# Patient Record
Sex: MALE | Race: WHITE | URBAN - METROPOLITAN AREA
[De-identification: names, ages, dates, MRNs, and addresses within clinical notes are randomized per-mention and may not be internally consistent; named-entity substitution may affect disease eponyms.]

---

## 2017-04-04 ENCOUNTER — OFFICE VISIT (OUTPATIENT)
Dept: FAMILY MEDICINE | Facility: CLINIC | Age: 52
End: 2017-04-04

## 2017-04-04 VITALS
OXYGEN SATURATION: 96 % | WEIGHT: 192 LBS | HEART RATE: 69 BPM | DIASTOLIC BLOOD PRESSURE: 83 MMHG | TEMPERATURE: 98.4 F | SYSTOLIC BLOOD PRESSURE: 123 MMHG | BODY MASS INDEX: 26.01 KG/M2 | HEIGHT: 72 IN

## 2017-04-04 DIAGNOSIS — J30.1 SEASONAL ALLERGIC RHINITIS DUE TO POLLEN: ICD-10-CM

## 2017-04-04 DIAGNOSIS — I25.10 CORONARY ARTERY DISEASE INVOLVING NATIVE HEART WITHOUT ANGINA PECTORIS, UNSPECIFIED VESSEL OR LESION TYPE: ICD-10-CM

## 2017-04-04 DIAGNOSIS — R53.83 FATIGUE, UNSPECIFIED TYPE: Primary | ICD-10-CM

## 2017-04-04 DIAGNOSIS — E78.5 HYPERLIPIDEMIA LDL GOAL <100: ICD-10-CM

## 2017-04-04 DIAGNOSIS — E55.9 VITAMIN D DEFICIENCY: ICD-10-CM

## 2017-04-04 DIAGNOSIS — K20.0 EOSINOPHILIC ESOPHAGITIS: ICD-10-CM

## 2017-04-04 DIAGNOSIS — Z91.018 ALLERGY TO FOOD: ICD-10-CM

## 2017-04-04 LAB
BASOPHILS # BLD AUTO: 0 10E9/L (ref 0–0.2)
BASOPHILS NFR BLD AUTO: 0.3 %
DIFFERENTIAL METHOD BLD: NORMAL
EOSINOPHIL # BLD AUTO: 0.2 10E9/L (ref 0–0.7)
EOSINOPHIL NFR BLD AUTO: 3.1 %
ERYTHROCYTE [DISTWIDTH] IN BLOOD BY AUTOMATED COUNT: 14.4 % (ref 10–15)
HCT VFR BLD AUTO: 46.6 % (ref 40–53)
HGB BLD-MCNC: 15 G/DL (ref 13.3–17.7)
IMM GRANULOCYTES # BLD: 0 10E9/L (ref 0–0.4)
IMM GRANULOCYTES NFR BLD: 0.3 %
LYMPHOCYTES # BLD AUTO: 1.8 10E9/L (ref 0.8–5.3)
LYMPHOCYTES NFR BLD AUTO: 25.9 %
MCH RBC QN AUTO: 27.7 PG (ref 26.5–33)
MCHC RBC AUTO-ENTMCNC: 32.2 G/DL (ref 31.5–36.5)
MCV RBC AUTO: 86 FL (ref 78–100)
MONOCYTES # BLD AUTO: 0.6 10E9/L (ref 0–1.3)
MONOCYTES NFR BLD AUTO: 8.7 %
NEUTROPHILS # BLD AUTO: 4.4 10E9/L (ref 1.6–8.3)
NEUTROPHILS NFR BLD AUTO: 61.7 %
NRBC # BLD AUTO: 0 10*3/UL
NRBC BLD AUTO-RTO: 0 /100
PLATELET # BLD AUTO: 162 10E9/L (ref 150–450)
RBC # BLD AUTO: 5.41 10E12/L (ref 4.4–5.9)
TSH SERPL DL<=0.005 MIU/L-ACNC: 1.49 MU/L (ref 0.4–4)
WBC # BLD AUTO: 7 10E9/L (ref 4–11)

## 2017-04-04 RX ORDER — LOVASTATIN 40 MG
40 TABLET ORAL AT BEDTIME
COMMUNITY
End: 2017-11-21

## 2017-04-04 RX ORDER — AMLODIPINE BESYLATE 10 MG/1
10 TABLET ORAL DAILY
COMMUNITY
End: 2017-11-21

## 2017-04-04 RX ORDER — OMEPRAZOLE 40 MG/1
CAPSULE, DELAYED RELEASE ORAL DAILY
COMMUNITY
End: 2017-11-21

## 2017-04-04 RX ORDER — LORATADINE 10 MG/1
10 TABLET, ORALLY DISINTEGRATING ORAL DAILY
COMMUNITY

## 2017-04-04 ASSESSMENT — PAIN SCALES - GENERAL: PAINLEVEL: NO PAIN (0)

## 2017-04-04 NOTE — PROGRESS NOTES
SUBJECTIVE:                                                    Jesus Bennett is a 52 year old male who presents to clinic today for the following health issues:  He is a new patient.  Not sure he wants to establish care.  Recently moved from [facility].  Sees cardiologist from [place]. CAD/HTN stable.  No recent stress test     CHIEF COMPLAINT: Fatigue that has been going on for the past 5-6 weeks following an acute URI.  Has been seen through urgent care and was treated with azithro for ear infection 3 weeks ago and again treated with azithro 3 days ago just because. Had negative chest xrays 3/31/2017. Care Everywhere reviewed. Denies cough.  Has had ongoing PND without facial pain pressure and purulent nasal discharge.     Acute Illness and persitent fatigue.     Onset: 5-6 weeks ago with most symptoms resolved is feeling some persistent fatigue. Generally feeling tired.  Sleeping OK. From a social perpective he is currently unemployed, seeking employment and struggling with this.    Fever: no    Chills/Sweats: no    Headache (location?): no    Sinus Pressure:no    Conjunctivitis:  no    Ear Pain: no    Rhinorrhea: no    Congestion: YES- mild and improving    Sore Throat: no     Cough: no    Wheeze: no    Decreased Appetite: no    Nausea: no    Vomiting: no    Diarrhea:  no    Dysuria/Freq.: no    Fatigue/Achiness: YES    Sick/Strep Exposure: no     Therapies Tried and outcome: Urgent care visits. Two courses of antibiotic.    Considers himself to be generally healthy. Has had a physical in the past year.   Hyperlipidemia Follow-Up      Rate your low fat/cholesterol diet?: good    Taking statin?  Yes, no muscle aches from statin    Other lipid medications/supplements?:  none     Hypertension Follow-up      Outpatient blood pressures are being checked at home.  Results are normal.  HTN under control.    Low Salt Diet: low salt     Vascular Disease Follow-up:  Coronary Artery Disease (CAD)  Cardiac event 20 years  ago. Followed at [facility].  Has had atleast 2 stress tests since event both normal per patient history.      Chest pain or pressure, left side neck or arm pain: No    Shortness of breath/increased sweats/nausea with exertion: No    Pain in calves walking 1-2 blocks: No    Worsened or new symptoms since last visit: No    Nitroglycerin use: no    Daily aspirin use: Yes     GERD/Eosinophilic esophagitis with recent endoscopy.  Takes 40mg omeprazole daily.  Allergy testing has been recommended but he has not had this done. Known history of food allergies with remote testing      Problem list and histories reviewed & adjusted, as indicated.  Additional history: as documented    Patient Active Problem List    Diagnosis Date Noted     Coronary artery disease involving native heart without angina pectoris, unspecified vessel or lesion type 04/06/2017     Priority: Medium     Seasonal allergic rhinitis due to pollen 04/06/2017     Priority: Medium     Allergy to food 04/06/2017     Priority: Medium     Eosinophilic esophagitis 04/06/2017     Priority: Medium     Fatigue, unspecified type 04/06/2017     Priority: Medium     Benign essential hypertension 04/06/2017     Priority: Medium     Hyperlipidemia LDL goal <100 04/06/2017     Priority: Medium     On a statin, mevacor.  Has had myalgia with other statins.       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 No pertinent surgical history.    Family History   Problem Relation Age of Onset     Hypertension Father      Prostate Cancer Father        Social History   Substance Use Topics     Smoking status: Never Smoker     Smokeless tobacco:  Not on file     Alcohol use Yes       Social History     Social History Narrative       Current Outpatient Prescriptions   Medication Sig Dispense Refill     Albuterol Sulfate 108 (90 BASE) MCG/ACT AEPB Inhale 2 puffs into the lungs every 4 hours       amLODIPine (NORVASC) 10 MG tablet Take 10 mg by mouth daily       aspirin 81 MG tablet Take 81 mg by mouth daily       loratadine (CLARITIN REDITABS) 10 MG ODT tab Take 10 mg by mouth daily       lovastatin (MEVACOR) 40 MG tablet Take 40 mg by mouth At Bedtime       omeprazole (PRILOSEC) 40 MG capsule Take by mouth daily             Reviewed and updated as needed this visit by clinical staff  Tobacco  Allergies  Meds  Problems  Med Hx  Surg Hx  Fam Hx  Soc Hx        Reviewed and updated as needed this visit by Provider  Tobacco  Allergies  Meds  Problems  Med Hx  Surg Hx  Fam Hx  Soc Hx        ROS:  Constitutional, HEENT, cardiovascular, pulmonary, GI, , musculoskeletal, neuro, skin, endocrine and psych systems are negative, except as otherwise noted.    OBJECTIVE:                                                    /83  Pulse 69  Temp 98.4  F (36.9  C) (Axillary)  Ht 6' (182.9 cm)  Wt 192 lb (87.1 kg)  SpO2 96%  BMI 26.04 kg/m2  Body mass index is 26.04 kg/(m^2).  GENERAL: healthy, alert and no distress  HENT: ear canals and TM's normal, nose and mouth without ulcers or lesions  NECK: no adenopathy, no asymmetry, masses, or scars and thyroid normal to palpation  RESP: lungs clear to auscultation - no rales, rhonchi or wheezes  CV: regular rate and rhythm, normal S1 S2, no S3 or S4, no murmur, click or rub, no peripheral edema and peripheral pulses strong  ABDOMEN: soft, nontender, no hepatosplenomegaly, no masses and bowel sounds normal  SKIN: no suspicious lesions or rashes  PSYCH: well dressed and groomed.  Good eye contact and is cooperative. Thoughts linear.  No delusions, compulsions or paranoia.  Affect flat.  Patient denies homicidal  and suicidal ideation as well as no thoughts or actions of self-harm.      Diagnostic Test Results:  Results for orders placed or performed in visit on 04/04/17   Vitamin D Deficiency   Result Value Ref Range    Vitamin D Deficiency screening 16 (L) 20 - 75 ug/L   TSH with free T4 reflex   Result Value Ref Range    TSH 1.49 0.40 - 4.00 mU/L   CBC with platelets differential   Result Value Ref Range    WBC 7.0 4.0 - 11.0 10e9/L    RBC Count 5.41 4.4 - 5.9 10e12/L    Hemoglobin 15.0 13.3 - 17.7 g/dL    Hematocrit 46.6 40.0 - 53.0 %    MCV 86 78 - 100 fl    MCH 27.7 26.5 - 33.0 pg    MCHC 32.2 31.5 - 36.5 g/dL    RDW 14.4 10.0 - 15.0 %    Platelet Count 162 150 - 450 10e9/L    Diff Method Automated Method     % Neutrophils 61.7 %    % Lymphocytes 25.9 %    % Monocytes 8.7 %    % Eosinophils 3.1 %    % Basophils 0.3 %    % Immature Granulocytes 0.3 %    Nucleated RBCs 0 0 /100    Absolute Neutrophil 4.4 1.6 - 8.3 10e9/L    Absolute Lymphocytes 1.8 0.8 - 5.3 10e9/L    Absolute Monocytes 0.6 0.0 - 1.3 10e9/L    Absolute Eosinophils 0.2 0.0 - 0.7 10e9/L    Absolute Basophils 0.0 0.0 - 0.2 10e9/L    Abs Immature Granulocytes 0.0 0 - 0.4 10e9/L    Absolute Nucleated RBC 0.0         ASSESSMENT/PLAN:                                                          ICD-10-CM    1. Fatigue, unspecified type R53.83 Vitamin D Deficiency     TSH with free T4 reflex     CBC with platelets differential   2. Vitamin D deficiency E55.9 Vitamin D Deficiency   3. Eosinophilic esophagitis K20.0 ALLERGY/ASTHMA ADULT REFERRAL   4. Allergy to food Z91.018 ALLERGY/ASTHMA ADULT REFERRAL   5. Seasonal allergic rhinitis due to pollen J30.1 ALLERGY/ASTHMA ADULT REFERRAL   6. Coronary artery disease involving native heart without angina pectoris, unspecified vessel or lesion type I25.10    7. Hyperlipidemia LDL goal <100 E78.5        Labs as above.  I do not recommend additional antibiotic at this point-no sign of bacterial infection.  Fatigue  differential is long and likely multi-factorial: Post-infectious, psychogenic, allergy related.  Encouraged allergy testing for eosinophilic esophagitis.  Discussed long term issues with taking PPI.  If needed it is appropriate but if specific food allergy is found it is possible PPI treatment can be reduced or stopped all together. Allergies could be contributing to fatigue. Consider vitamin B supplement.  Consider establishing care and come in for physical.  HTN/ hyperlipidemia and CAD appear to be well controlled.  Vitamin D supplement recommended see result note to patient.  Follow up if you have any worsening or persistent problems or concerns.        Elizabeth Peña PA-C  [facility]

## 2017-04-04 NOTE — NURSING NOTE
Chief Complaint   Patient presents with     Fatigue     He is not sure he wants to establish care yet.     52 year old      Blood pressure 123/83, pulse 69, temperature 98.4  F (36.9  C), temperature source Axillary, height 6' (182.9 cm), weight 192 lb (87.1 kg), SpO2 96 %. Body mass index is 26.04 kg/(m^2).    Wt Readings from Last 2 Encounters:   04/04/17 192 lb (87.1 kg)     BP Readings from Last 3 Encounters:   04/04/17 123/83       There is no problem list on file for this patient.      Current Outpatient Prescriptions   Medication Sig Dispense Refill     Albuterol Sulfate 108 (90 BASE) MCG/ACT AEPB Inhale 2 puffs into the lungs every 4 hours       amLODIPine (NORVASC) 10 MG tablet Take 10 mg by mouth daily       aspirin 81 MG tablet Take 81 mg by mouth daily       loratadine (CLARITIN REDITABS) 10 MG ODT tab Take 10 mg by mouth daily       lovastatin (MEVACOR) 40 MG tablet Take 40 mg by mouth At Bedtime       omeprazole (PRILOSEC) 40 MG capsule Take by mouth daily         Social History   Substance Use Topics     Smoking status: Never Smoker     Smokeless tobacco: Not on file     Alcohol use Yes         Health Maintenance Due   Topic Date Due     LIPID SCREEN Q5 YR MALE (SYSTEM ASSIGNED)  01/17/2002       ISAIAH ANGEL CMA  April 4, 2017 10:59 AM

## 2017-04-04 NOTE — LETTER
[employer]ED Customer Service  [facility]  [address], [address]  [place] [address]  Fax: [phone]  Phone: [phone]      April 3, 2017      Jesus Bennett  [address]   [place] [address]        Dear Jesus,    Thank you for your interest in becoming a FRAMED user!    Your access code is: [email or web address]  Expires: 2017  8:25 AM     Please access the FRAMED website at [email or web address].  Below the ID and password fields, select the  Sign Up Now  as New User.  You will be prompted to enter the access code listed above as well as additional personal information.  Please follow the directions carefully when creating your username and password.    If you allow your access code to , or if you have any questions please call a FRAMED Representative at [phone] during normal clinic hours.     Sincerely,      [employer] Customer Service  [facility]

## 2017-04-04 NOTE — MR AVS SNAPSHOT
After Visit Summary   4/4/2017    Jesus Bennett    MRN: [id number]           Patient Information     Date Of Birth          1965        Visit Information        Provider Department      4/4/2017 10:40 AM Elizabeth Peña PA-C [facility]        Today's Diagnoses     Fatigue, unspecified type    -  1    Coronary artery disease involving native heart without angina pectoris, unspecified vessel or lesion type        Eosinophilic esophagitis        Seasonal allergic rhinitis due to pollen        Allergy to food           Follow-ups after your visit        Additional Services     ALLERGY/ASTHMA ADULT REFERRAL       Your provider has referred you to: [facility]: [facility], [facility]. [facility] [phone]  Fax:  [phone] [email or web address]  [facility]: [facility] [phone]   [email or web address]    Please be aware that coverage of these services is subject to the terms and limitations of your health insurance plan.  Call member services at your health plan with any benefit or coverage questions.      Please bring the following with you to your appointment:    (1) Any X-Rays, CTs or MRIs which have been performed.  Contact the facility where they were done to arrange for  prior to your scheduled appointment.    (2) List of current medications  (3) This referral request   (4) Any documents/labs given to you for this referral                  Who to contact     Please call your clinic at [phone] to:    Ask questions about your health    Make or cancel appointments    Discuss your medicines    Learn about your test results    Speak to your doctor   If you have compliments or concerns about an experience at your clinic, or if you wish to file a complaint, please contact [facility] Patient Relations at [phone] or email us at  [email or web address]         Additional Information About Your Visit        [employer] Information     Apalya is an electronic gateway that provides easy, online access to your medical records. With Apalya, you can request a clinic appointment, read your test results, renew a prescription or communicate with your care team.     To sign up for Apalya visit the website at [email or web address]   You will be asked to enter the access code listed below, as well as some personal information. Please follow the directions to create your username and password.     Your access code is: [email or web address]  Expires: 2017  8:25 AM     Your access code will  in 90 days. If you need help or a new code, please contact your [facility] or call [phone] for assistance.        Care EveryWhere ID     This is your Care EveryWhere ID. This could be used by other organizations to access your [place] medical records  [id number]        Your Vitals Were     Pulse Temperature Height Pulse Oximetry BMI (Body Mass Index)       69 98.4  F (36.9  C) (Axillary) 6' (182.9 cm) 96% 26.04 kg/m2        Blood Pressure from Last 3 Encounters:   17 123/83    Weight from Last 3 Encounters:   17 192 lb (87.1 kg)              We Performed the Following     ALLERGY/ASTHMA ADULT REFERRAL     CBC with platelets differential     TSH with free T4 reflex     Vitamin D Deficiency        Primary Care Provider Office Phone # Fax #    Elizabeth Peña PA-C [phone]       [address] [address]        Thank you!     Thank you for choosing [facility]  for your care. Our goal is always to provide you with excellent care. Hearing back from our patients is one way we can continue to improve our services. Please take a few minutes to complete the written survey that you may receive in the mail after your visit with us. Thank you!              Your Updated Medication List - Protect others around you: Learn how to safely use, store and throw away your medicines at www.disposemymeds.org.          This list is accurate as of: 4/4/17 11:35 AM.  Always use your most recent med list.                   Brand Name Dispense Instructions for use    Albuterol Sulfate 108 (90 BASE) MCG/ACT Aepb      Inhale 2 puffs into the lungs every 4 hours       aspirin 81 MG tablet      Take 81 mg by mouth daily       loratadine 10 MG ODT tab    CLARITIN REDITABS     Take 10 mg by mouth daily       lovastatin 40 MG tablet    MEVACOR     Take 40 mg by mouth At Bedtime       NORVASC 10 MG tablet   Generic drug:  amLODIPine      Take 10 mg by mouth daily       omeprazole 40 MG capsule    priLOSEC     Take by mouth daily

## 2017-04-05 PROBLEM — E55.9 VITAMIN D DEFICIENCY: Status: ACTIVE | Noted: 2017-04-05

## 2017-04-05 LAB — DEPRECATED CALCIDIOL+CALCIFEROL SERPL-MC: 16 UG/L (ref 20–75)

## 2017-04-06 PROBLEM — K20.0 EOSINOPHILIC ESOPHAGITIS: Status: ACTIVE | Noted: 2017-04-06

## 2017-04-06 PROBLEM — I10 BENIGN ESSENTIAL HYPERTENSION: Status: ACTIVE | Noted: 2017-04-06

## 2017-04-06 PROBLEM — R53.83 FATIGUE, UNSPECIFIED TYPE: Status: ACTIVE | Noted: 2017-04-06

## 2017-04-06 PROBLEM — J30.1 SEASONAL ALLERGIC RHINITIS DUE TO POLLEN: Status: ACTIVE | Noted: 2017-04-06

## 2017-04-06 PROBLEM — E78.5 HYPERLIPIDEMIA LDL GOAL <100: Status: ACTIVE | Noted: 2017-04-06

## 2017-04-06 PROBLEM — Z91.018 ALLERGY TO FOOD: Status: ACTIVE | Noted: 2017-04-06

## 2017-04-06 PROBLEM — I25.10 CORONARY ARTERY DISEASE INVOLVING NATIVE HEART WITHOUT ANGINA PECTORIS, UNSPECIFIED VESSEL OR LESION TYPE: Status: ACTIVE | Noted: 2017-04-06

## 2017-06-22 ENCOUNTER — OFFICE VISIT (OUTPATIENT)
Dept: FAMILY MEDICINE | Facility: CLINIC | Age: 52
End: 2017-06-22

## 2017-06-22 VITALS
WEIGHT: 196 LBS | SYSTOLIC BLOOD PRESSURE: 115 MMHG | BODY MASS INDEX: 26.58 KG/M2 | OXYGEN SATURATION: 96 % | TEMPERATURE: 97.9 F | DIASTOLIC BLOOD PRESSURE: 77 MMHG | HEART RATE: 65 BPM

## 2017-06-22 DIAGNOSIS — L91.8 SKIN TAG: ICD-10-CM

## 2017-06-22 DIAGNOSIS — F41.0 ANXIETY ATTACK: Primary | ICD-10-CM

## 2017-06-22 DIAGNOSIS — S99.921A INJURY OF TOE, RIGHT, INITIAL ENCOUNTER: ICD-10-CM

## 2017-06-22 RX ORDER — LORAZEPAM 1 MG/1
1 TABLET ORAL EVERY 6 HOURS PRN
Qty: 21 TABLET | Refills: 0 | Status: SHIPPED | OUTPATIENT
Start: 2017-06-22

## 2017-06-22 NOTE — MR AVS SNAPSHOT
After Visit Summary   6/22/2017    Jesus Bennett    MRN: [id number]           Patient Information     Date Of Birth          1965        Visit Information        Provider Department      6/22/2017 1:40 PM Elizabeth Peña PA-C [facility]        Today's Diagnoses     Anxiety attack    -  1       Follow-ups after your visit        Who to contact     Please call your clinic at [phone] to:    Ask questions about your health    Make or cancel appointments    Discuss your medicines    Learn about your test results    Speak to your doctor   If you have compliments or concerns about an experience at your clinic, or if you wish to file a complaint, please contact [facility] Patient Relations at [phone] or email us at [email or web address]         Additional Information About Your Visit        MyChart Information     [employer] gives you secure access to your electronic health record. If you see a primary care provider, you can also send messages to your care team and make appointments. If you have questions, please call your primary care clinic.  If you do not have a primary care provider, please call [phone] and they will assist you.      Hydrelis is an electronic gateway that provides easy, online access to your medical records. With Hydrelis, you can request a clinic appointment, read your test results, renew a prescription or communicate with your care team.     To access your existing account, please contact your [facility] or call [phone] for assistance.        Care EveryWhere ID     This is your Care EveryWhere ID. This could be used by other organizations to access your [place] medical records  [id number]        Your Vitals Were     Pulse Temperature Pulse Oximetry BMI (Body Mass Index)          65 97.9  F (36.6  C) (Oral) 96% 26.58 kg/m2         Blood Pressure from Last 3 Encounters:   06/22/17  115/77   04/04/17 123/83    Weight from Last 3 Encounters:   06/22/17 196 lb (88.9 kg)   04/04/17 192 lb (87.1 kg)              Today, you had the following     No orders found for display         Today's Medication Changes          These changes are accurate as of: 6/22/17  3:04 PM.  If you have any questions, ask your nurse or doctor.               Start taking these medicines.        Dose/Directions    LORazepam 1 MG tablet   Commonly known as:  ATIVAN   Used for:  Anxiety attack   Started by:  Elizabeth Peña PA-C        Dose:  1 mg   Take 1 tablet (1 mg) by mouth every 6 hours as needed for anxiety   Quantity:  21 tablet   Refills:  0            Where to get your medicines      Some of these will need a paper prescription and others can be bought over the counter.  Ask your nurse if you have questions.     Bring a paper prescription for each of these medications     LORazepam 1 MG tablet                Primary Care Provider Office Phone # Fax #    Elizabeth Peña PA-C [phone]       [address] [address]        Equal Access to Services     BRIDGETTE KESSLER : Hadii josey bacono Sobarbara, waaxda luqadaha, qaybta kaalmada ademandie, corbin ballesteros . So [facility] [phone].    ATENCIÓN: Si habla español, tiene a ken disposición servicios gratuitos de asistencia lingüística. Johname al [phone].    We comply with applicable federal civil rights laws and [place] laws. We do not discriminate on the basis of race, color, national origin, age, disability sex, sexual orientation or gender identity.            Thank you!     Thank you for choosing [facility]  for your care. Our goal is always to provide you with excellent care. Hearing back from our patients is one way we can continue to improve our services. Please take a few minutes to complete the written survey that you may receive in the mail after your visit with us. Thank  you!             Your Updated Medication List - Protect others around you: Learn how to safely use, store and throw away your medicines at www.disposemymeds.org.          This list is accurate as of: 6/22/17  3:04 PM.  Always use your most recent med list.                   Brand Name Dispense Instructions for use Diagnosis    Albuterol Sulfate 108 (90 BASE) MCG/ACT Aepb      Inhale 2 puffs into the lungs every 4 hours        aspirin 81 MG tablet      Take 81 mg by mouth daily        loratadine 10 MG ODT tab    CLARITIN REDITABS     Take 10 mg by mouth daily        LORazepam 1 MG tablet    ATIVAN    21 tablet    Take 1 tablet (1 mg) by mouth every 6 hours as needed for anxiety    Anxiety attack       lovastatin 40 MG tablet    MEVACOR     Take 40 mg by mouth At Bedtime        NORVASC 10 MG tablet   Generic drug:  amLODIPine      Take 10 mg by mouth daily        omeprazole 40 MG capsule    priLOSEC     Take by mouth daily

## 2017-06-22 NOTE — PROGRESS NOTES
SUBJECTIVE:                                                    Jesus Bennett is a 52 year old male who presents to clinic today for the following health issues:    Toe injury: Occurred 2 days ago at home when he stubbed it on furniture.  Pain has improved with time.  Has been sterling taping.  Sleeping OK and not taking any pain meds including OTC. He is concerned for fracture.         Description:   Location: right 4th toe  Character: Dull ache    Intensity: mild    Progression of Symptoms: better    Accompanying Signs & Symptoms:  Other symptoms: none   History:   Previous similar pain: no       Precipitating factors:   Trauma or overuse: YES- as above    Alleviating factors:  Improved by: rest/inactivity       Therapies Tried and outcome: None      Anxiety:  Patient requests a refill on lorazepam that he takes for acute anxiety.  He reports taking it 2-3 times per month usually related to stressful situations at work.  He has used other medications for anxiety including buspirone and SSRI that were stopped due to side effects.  [facility] monitoring queried and reviewed with noted excessive use.       Description:   Depression: no  Anxiety: YES    Accompanying Signs & Symptoms:  Still participating in activities that you used to enjoy: no  Fatigue: no  Irritability: no  Difficulty concentrating: no  Changes in appetite: no  Problems with sleep: yes at times  Heart racing/beating fast : YES  Thoughts of hurting yourself or others: none     History:   Recent stress: no  Prior depression hospitalization: None  Family history of depression: no  Family history of anxiety: no      Precipitating factors:   Alcohol/drug use: no    Alleviating factors:         Therapies Tried and outcome: see above      Skin tag:  Right posterior upper thigh.  Has been present for years without recent change.  Tends to get irritated by clothing.  Patient requests removal.         Problem list and histories reviewed & adjusted, as  indicated.  Additional history: as documented    Patient Active Problem List   Diagnosis     Vitamin D deficiency     Coronary artery disease involving native heart without angina pectoris, unspecified vessel or lesion type     Seasonal allergic rhinitis due to pollen     Allergy to food     Eosinophilic esophagitis     Fatigue, unspecified type     Benign essential hypertension     Genital herpes simplex     Hyperlipidemia LDL goal <100     No past surgical history on file.    Social History   Substance Use Topics     Smoking status: Never Smoker     Smokeless tobacco: Not on file     Alcohol use Yes     Family History   Problem Relation Age of Onset     Hypertension Father      Prostate Cancer Father            Reviewed and updated as needed this visit by clinical staff  Tobacco  Allergies  Meds  Problems       Reviewed and updated as needed this visit by Provider  Allergies  Meds  Problems         ROS:  Constitutional, HEENT, cardiovascular, pulmonary, gi and gu systems are negative, except as otherwise noted.    OBJECTIVE:                                                    /77  Pulse 65  Temp 97.9  F (36.6  C) (Oral)  Wt 196 lb (88.9 kg)  SpO2 96%  BMI 26.58 kg/m2  Body mass index is 26.58 kg/(m^2).  GENERAL: healthy, alert and no distress  RESP: lungs clear to auscultation - no rales, rhonchi or wheezes  CV: regular rate and rhythm, normal S1 S2, no S3 or S4, no murmur, click or rub, no peripheral edema and peripheral pulses strong  MS: Right 4th toe with minimal deformity related to distal swelling and eccymosis.  Minimal tenderness with with palpation. PEDRO. Strength intact.  Sensation intact.  SKIN: 5mm skin tag posterior upper right thigh.  Without redness or irritation.  PSYCH: well dressed and groomed.  Good eye contact and is cooperative. Thoughts linear.  No delusions, compulsions or paranoia.  Affect bright and normal.  Patient denies homicidal and suicidal ideation as well as no  thoughts or actions of self-harm.    Procedure:  Skin tag and surrounding skin cleaned with chlorprep. Sterile scissors used to snip off skin tag.  No bleeding. Covered with bandage.       ASSESSMENT/PLAN:                                                        ICD-10-CM    1. Anxiety attack F41.0 LORazepam (ATIVAN) 1 MG tablet   2. Injury of toe, right, initial encounter S99.921A    3. Skin tag L91.8 REMOVAL OF SKIN TAGS, FIRST 15     Benign etiology of skin tags reviewed.  Watch for signs of infection.  Continue with buddy taping.  No further treatment of toe recommended.  Follow up as needed.    Rx monitoring system reviewed.  Reviewed role of lorazepam for treatment of acute anxiety. Limit use.  Do not drink or drive while taking.  Will monitor use.  With increase in symptoms consider controller medication.  Follow up if you have any worsening or persistent problems or concerns.      Elizabeth Peña PA-C  [facility]

## 2017-11-21 DIAGNOSIS — K21.9 GASTROESOPHAGEAL REFLUX DISEASE, ESOPHAGITIS PRESENCE NOT SPECIFIED: ICD-10-CM

## 2017-11-21 DIAGNOSIS — I10 BENIGN ESSENTIAL HYPERTENSION: Primary | ICD-10-CM

## 2017-11-21 DIAGNOSIS — E78.5 HYPERLIPIDEMIA LDL GOAL <100: ICD-10-CM

## 2017-11-21 RX ORDER — AMLODIPINE BESYLATE 10 MG/1
10 TABLET ORAL DAILY
Qty: 30 TABLET | Refills: 1 | Status: SHIPPED | OUTPATIENT
Start: 2017-11-21 | End: 2018-01-08

## 2017-11-21 RX ORDER — LOVASTATIN 40 MG
TABLET ORAL
Qty: 60 TABLET | Refills: 1 | Status: SHIPPED | OUTPATIENT
Start: 2017-11-21 | End: 2018-01-08

## 2017-11-21 RX ORDER — OMEPRAZOLE 40 MG/1
40 CAPSULE, DELAYED RELEASE ORAL DAILY
Qty: 30 CAPSULE | Refills: 1 | Status: SHIPPED | OUTPATIENT
Start: 2017-11-21 | End: 2018-01-08

## 2018-01-08 DIAGNOSIS — K21.9 GASTROESOPHAGEAL REFLUX DISEASE, ESOPHAGITIS PRESENCE NOT SPECIFIED: ICD-10-CM

## 2018-01-08 DIAGNOSIS — E78.5 HYPERLIPIDEMIA LDL GOAL <100: ICD-10-CM

## 2018-01-08 DIAGNOSIS — I10 BENIGN ESSENTIAL HYPERTENSION: ICD-10-CM

## 2018-01-08 DIAGNOSIS — I25.2 HISTORY OF MI (MYOCARDIAL INFARCTION): Primary | ICD-10-CM

## 2018-01-08 RX ORDER — LOVASTATIN 40 MG
TABLET ORAL
Qty: 180 TABLET | Refills: 0 | Status: SHIPPED | OUTPATIENT
Start: 2018-01-08

## 2018-01-08 RX ORDER — NITROGLYCERIN 0.4 MG/1
TABLET SUBLINGUAL
Qty: 25 TABLET | Refills: 0 | Status: SHIPPED | OUTPATIENT
Start: 2018-01-08

## 2018-01-08 RX ORDER — OMEPRAZOLE 40 MG/1
40 CAPSULE, DELAYED RELEASE ORAL DAILY
Qty: 90 CAPSULE | Refills: 0 | Status: SHIPPED | OUTPATIENT
Start: 2018-01-08

## 2018-01-08 RX ORDER — AMLODIPINE BESYLATE 10 MG/1
10 TABLET ORAL DAILY
Qty: 90 TABLET | Refills: 0 | Status: SHIPPED | OUTPATIENT
Start: 2018-01-08

## 2018-01-08 NOTE — TELEPHONE ENCOUNTER
Spoke with pt , as at initial visit with Nette last April,  He was not sure he was going to continue here in our clinic, ie to even est care here or not .  Pt states he recalls this, but he has not found another provider since then.  He states he is moving to [place] at the end of the month, and now really would just like a 3 month supply on his meds to get him through the move and time to find a new provider in [place].  Nette notified, and also of his request for SL nitro, which he rarely uses, ( had from [facility] 20 years ago) and is now .    Pt was followed at [place] prior  to coming here, and he is in need of lab work also     Nette agrees to 3 month supply on his needed meds, and also for one bottle of the SL NTG.  No need for labs from us now. Pt agrees to get all done with new provider  Final Fill from us.     Felicia Hardy RN  2018 10:49 AM        .

## 2020-03-10 ENCOUNTER — HEALTH MAINTENANCE LETTER (OUTPATIENT)
Age: 55
End: 2020-03-10

## 2020-12-27 ENCOUNTER — HEALTH MAINTENANCE LETTER (OUTPATIENT)
Age: 55
End: 2020-12-27

## 2021-04-24 ENCOUNTER — HEALTH MAINTENANCE LETTER (OUTPATIENT)
Age: 56
End: 2021-04-24

## 2021-10-09 ENCOUNTER — HEALTH MAINTENANCE LETTER (OUTPATIENT)
Age: 56
End: 2021-10-09

## 2022-05-21 ENCOUNTER — HEALTH MAINTENANCE LETTER (OUTPATIENT)
Age: 57
End: 2022-05-21

## 2022-09-11 ENCOUNTER — HEALTH MAINTENANCE LETTER (OUTPATIENT)
Age: 57
End: 2022-09-11

## 2023-06-03 ENCOUNTER — HEALTH MAINTENANCE LETTER (OUTPATIENT)
Age: 58
End: 2023-06-03